# Patient Record
Sex: MALE | NOT HISPANIC OR LATINO | URBAN - METROPOLITAN AREA
[De-identification: names, ages, dates, MRNs, and addresses within clinical notes are randomized per-mention and may not be internally consistent; named-entity substitution may affect disease eponyms.]

---

## 2022-11-30 ENCOUNTER — LAB (OUTPATIENT)
Dept: LAB | Facility: CLINIC | Age: 37
End: 2022-11-30
Payer: COMMERCIAL

## 2022-11-30 ENCOUNTER — ANCILLARY PROCEDURE (OUTPATIENT)
Dept: GENERAL RADIOLOGY | Facility: CLINIC | Age: 37
End: 2022-11-30
Attending: NURSE PRACTITIONER
Payer: COMMERCIAL

## 2022-11-30 ENCOUNTER — OFFICE VISIT (OUTPATIENT)
Dept: FAMILY MEDICINE | Facility: CLINIC | Age: 37
End: 2022-11-30
Payer: COMMERCIAL

## 2022-11-30 VITALS
RESPIRATION RATE: 18 BRPM | HEART RATE: 53 BPM | TEMPERATURE: 97.9 F | DIASTOLIC BLOOD PRESSURE: 79 MMHG | OXYGEN SATURATION: 98 % | WEIGHT: 126 LBS | SYSTOLIC BLOOD PRESSURE: 124 MMHG

## 2022-11-30 DIAGNOSIS — R68.84 JAW PAIN: ICD-10-CM

## 2022-11-30 DIAGNOSIS — Z23 ENCOUNTER FOR IMMUNIZATION: ICD-10-CM

## 2022-11-30 DIAGNOSIS — F33.1 MODERATE EPISODE OF RECURRENT MAJOR DEPRESSIVE DISORDER (H): ICD-10-CM

## 2022-11-30 DIAGNOSIS — R63.4 UNINTENTIONAL WEIGHT LOSS: Primary | ICD-10-CM

## 2022-11-30 DIAGNOSIS — R63.4 UNINTENTIONAL WEIGHT LOSS: ICD-10-CM

## 2022-11-30 LAB
ALBUMIN SERPL BCG-MCNC: 4.7 G/DL (ref 3.5–5.2)
ALP SERPL-CCNC: 72 U/L (ref 40–129)
ALT SERPL W P-5'-P-CCNC: 11 U/L (ref 10–50)
ANION GAP SERPL CALCULATED.3IONS-SCNC: 12 MMOL/L (ref 7–15)
AST SERPL W P-5'-P-CCNC: 18 U/L (ref 10–50)
BASOPHILS # BLD AUTO: 0 10E3/UL (ref 0–0.2)
BASOPHILS NFR BLD AUTO: 1 %
BILIRUB SERPL-MCNC: 0.3 MG/DL
BUN SERPL-MCNC: 14.5 MG/DL (ref 6–20)
CALCIUM SERPL-MCNC: 9.7 MG/DL (ref 8.6–10)
CHLORIDE SERPL-SCNC: 102 MMOL/L (ref 98–107)
CREAT SERPL-MCNC: 0.76 MG/DL (ref 0.67–1.17)
DEPRECATED HCO3 PLAS-SCNC: 26 MMOL/L (ref 22–29)
EOSINOPHIL # BLD AUTO: 0.3 10E3/UL (ref 0–0.7)
EOSINOPHIL NFR BLD AUTO: 6 %
ERYTHROCYTE [DISTWIDTH] IN BLOOD BY AUTOMATED COUNT: 14.8 % (ref 10–15)
GFR SERPL CREATININE-BSD FRML MDRD: >90 ML/MIN/1.73M2
GLUCOSE SERPL-MCNC: 96 MG/DL (ref 70–99)
HCT VFR BLD AUTO: 38.5 % (ref 40–53)
HCV AB SERPL QL IA: NONREACTIVE
HGB BLD-MCNC: 12.8 G/DL (ref 13.3–17.7)
HIV 1+2 AB+HIV1 P24 AG SERPL QL IA: NONREACTIVE
IMM GRANULOCYTES # BLD: 0 10E3/UL
IMM GRANULOCYTES NFR BLD: 0 %
LYMPHOCYTES # BLD AUTO: 1.8 10E3/UL (ref 0.8–5.3)
LYMPHOCYTES NFR BLD AUTO: 31 %
MCH RBC QN AUTO: 31.3 PG (ref 26.5–33)
MCHC RBC AUTO-ENTMCNC: 33.2 G/DL (ref 31.5–36.5)
MCV RBC AUTO: 94 FL (ref 78–100)
MONOCYTES # BLD AUTO: 0.6 10E3/UL (ref 0–1.3)
MONOCYTES NFR BLD AUTO: 11 %
NEUTROPHILS # BLD AUTO: 2.9 10E3/UL (ref 1.6–8.3)
NEUTROPHILS NFR BLD AUTO: 51 %
NRBC # BLD AUTO: 0 10E3/UL
NRBC BLD AUTO-RTO: 0 /100
PLATELET # BLD AUTO: 198 10E3/UL (ref 150–450)
POTASSIUM SERPL-SCNC: 4.2 MMOL/L (ref 3.4–5.3)
PROT SERPL-MCNC: 7.7 G/DL (ref 6.4–8.3)
RBC # BLD AUTO: 4.09 10E6/UL (ref 4.4–5.9)
SODIUM SERPL-SCNC: 140 MMOL/L (ref 136–145)
TSH SERPL DL<=0.005 MIU/L-ACNC: 2.69 UIU/ML (ref 0.3–4.2)
WBC # BLD AUTO: 5.7 10E3/UL (ref 4–11)

## 2022-11-30 PROCEDURE — 71046 X-RAY EXAM CHEST 2 VIEWS: CPT | Performed by: RADIOLOGY

## 2022-11-30 PROCEDURE — 99204 OFFICE O/P NEW MOD 45 MIN: CPT | Mod: 25 | Performed by: NURSE PRACTITIONER

## 2022-11-30 PROCEDURE — 90471 IMMUNIZATION ADMIN: CPT | Performed by: NURSE PRACTITIONER

## 2022-11-30 PROCEDURE — 86481 TB AG RESPONSE T-CELL SUSP: CPT | Performed by: NURSE PRACTITIONER

## 2022-11-30 PROCEDURE — 0124A COVID-19 VACCINE BIVALENT BOOSTER 12+ (PFIZER): CPT | Performed by: NURSE PRACTITIONER

## 2022-11-30 PROCEDURE — 36415 COLL VENOUS BLD VENIPUNCTURE: CPT | Performed by: PATHOLOGY

## 2022-11-30 PROCEDURE — 80050 GENERAL HEALTH PANEL: CPT | Performed by: PATHOLOGY

## 2022-11-30 PROCEDURE — 90686 IIV4 VACC NO PRSV 0.5 ML IM: CPT | Performed by: NURSE PRACTITIONER

## 2022-11-30 PROCEDURE — 91312 COVID-19 VACCINE BIVALENT BOOSTER 12+ (PFIZER): CPT | Performed by: NURSE PRACTITIONER

## 2022-11-30 PROCEDURE — 86803 HEPATITIS C AB TEST: CPT | Performed by: NURSE PRACTITIONER

## 2022-11-30 PROCEDURE — 87389 HIV-1 AG W/HIV-1&-2 AB AG IA: CPT | Performed by: NURSE PRACTITIONER

## 2022-11-30 RX ORDER — METHADONE HYDROCHLORIDE 10 MG/5ML
130 SOLUTION ORAL DAILY
COMMUNITY

## 2022-11-30 ASSESSMENT — ANXIETY QUESTIONNAIRES
2. NOT BEING ABLE TO STOP OR CONTROL WORRYING: MORE THAN HALF THE DAYS
3. WORRYING TOO MUCH ABOUT DIFFERENT THINGS: SEVERAL DAYS
7. FEELING AFRAID AS IF SOMETHING AWFUL MIGHT HAPPEN: NOT AT ALL
6. BECOMING EASILY ANNOYED OR IRRITABLE: SEVERAL DAYS
1. FEELING NERVOUS, ANXIOUS, OR ON EDGE: MORE THAN HALF THE DAYS
GAD7 TOTAL SCORE: 8
GAD7 TOTAL SCORE: 8
5. BEING SO RESTLESS THAT IT IS HARD TO SIT STILL: SEVERAL DAYS

## 2022-11-30 ASSESSMENT — PATIENT HEALTH QUESTIONNAIRE - PHQ9
SUM OF ALL RESPONSES TO PHQ QUESTIONS 1-9: 17
5. POOR APPETITE OR OVEREATING: SEVERAL DAYS

## 2022-11-30 NOTE — NURSING NOTE
Chief Complaint   Patient presents with     Establish Care     Hasnt been to the doctor for a while      Physical     Has lost weight, about 30 lbs over roughly a year  Has not tried to loose weight   Is only eating about one meal a day      Blood pressure 124/79, pulse 53, temperature 97.9  F (36.6  C), temperature source Oral, resp. rate 18, weight 57.2 kg (126 lb), SpO2 98 %.    Joanne Carreon

## 2022-11-30 NOTE — PROGRESS NOTES
"Today's Date: Nov 30, 2022     Patient Melquiades Frye 1985 presents to the clinic today to address   Chief Complaint   Patient presents with     Establish Care     Hasnt been to the doctor for a while      Physical     Has lost weight, about 30 lbs over roughly a year  Has not tried to loose weight   Is only eating about one meal a day              SUBJECTIVE     History of Present Illness:    37-year-old male with PMH ADHD, Anxiety/MDD, Learning disability, substance use disorder (pt on methadone for past 5 years through [facility]) presents to establish care and discuss unintentional weight loss.    Unintentional weight loss- Patient reports losing 30lbs over the past 6-8 months. He endorses eating only 1 meal/day, which is normal for him. He uses marijuana daily at night to help him wind down, 0.5PPD tobacco, and scheduled methadone in the morning. He denies IVDU/amphethamine use (he was on Ritalin for ADHD while in grade school.) He endorses increased stress/depressive symptoms since his step-father was diagnosed with esophageal cancer (his family lives in [facility].) He was previously on Wellbutrin for depression but stopped it after 1 month since he \"didn't know if it was working\" and due to concerns of being on a medication daily. He denies SI/HI today. He denies CP, SOB, blood in stool, difficulty swallowing. He endorses chronic stomach cramping (reports being diagnosed with IBS in the past.) Endorses feeling tired but feels that is due to not staying asleep at night. 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 He has been homeless in the past though he denies coughing up blood.       R Jaw/Mouth pain- Patient noticed right Jaw/mouth pain that started last night without fevers, swelling, redness, difficulty swallowing. He reports seeing a dentist within the last year. No other acute concerns/symptoms at time of exam.    Review of Systems   Constitutional, HEENT, cardiovascular, pulmonary, gi and gu systems " are negative, except as otherwise noted.      Allergies   Allergen Reactions     Seasonal Allergies         No current outpatient medications    Past Medical History:   Diagnosis Date     ADD (attention deficit disorder)      ADHD (attention deficit hyperactivity disorder)      Anxiety      Depressive disorder      Learning disability         Family History   Problem Relation Age of Onset     Esophageal Cancer Father         Social History     Tobacco Use     Smoking status: Every Day     Packs/day: 0.50     Types: Cigarettes     Smokeless tobacco: Never   Vaping Use     Vaping Use: Never used   Substance Use Topics     Alcohol use: Not Currently     Comment: sober since september 4 2010     Drug use: Yes     Types: Marijuana        History   Sexual Activity     Sexual activity: Not on file        PHQ 11/30/2022   PHQ-9 Total Score 17   Q9: Thoughts of better off dead/self-harm past 2 weeks Not at all          There is no immunization history on file for this patient.     Health Maintenance components reviewed - Seasonal Influenza vaccine status is due & Covid-19 vaccine status is due.  Denies FHX colon/prostate cancer.           OBJECTIVE     /79 (BP Location: Right arm, Patient Position: Sitting, Cuff Size: Adult Regular)   Pulse 53   Temp 97.9  F (36.6  C) (Oral)   Resp 18   Wt 57.2 kg (126 lb)   SpO2 98%      Labs:  No results found for: WBC, HGB, HCT, PLT, CHOL, TRIG, HDL, LDLDIRECT, ALT, AST, NA, CREATININE, BUN, CO2, TSH, PSA, INR, GLUF, HGBA1C, MICROALBUR     Physical Exam  Constitutional:       General: He is not in acute distress.     Appearance: He is not ill-appearing.   HENT:      Head: Normocephalic.      Jaw: There is normal jaw occlusion. Tenderness present. No swelling or pain on movement.        Comments: Tenderness as noted. No TMJ tenderness noted.     Right Ear: Tympanic membrane normal.      Left Ear: Tympanic membrane normal.      Nose: Nose normal.      Mouth/Throat:      Mouth:  Mucous membranes are moist.      Tongue: No lesions.      Pharynx: Oropharynx is clear.   Eyes:      Extraocular Movements: Extraocular movements intact.      Pupils: Pupils are equal, round, and reactive to light.   Cardiovascular:      Rate and Rhythm: Normal rate.      Heart sounds: Normal heart sounds. No murmur heard.    No friction rub. No gallop.   Pulmonary:      Effort: Pulmonary effort is normal. No respiratory distress.      Breath sounds: Normal breath sounds. No wheezing, rhonchi or rales.   Abdominal:      General: Abdomen is flat. Bowel sounds are normal.      Palpations: Abdomen is soft. There is no hepatomegaly or mass.      Tenderness: There is no abdominal tenderness.   Genitourinary:     Comments: Declined by pt.  Musculoskeletal:         General: Normal range of motion.      Cervical back: Neck supple.      Right lower leg: No edema.      Left lower leg: No edema.   Lymphadenopathy:      Cervical: No cervical adenopathy.   Skin:     General: Skin is warm.   Neurological:      General: No focal deficit present.      Mental Status: He is alert.   Psychiatric:         Mood and Affect: Mood is anxious.         Speech: Speech is rapid and pressured.         Behavior: Behavior normal. Behavior is cooperative.         Thought Content: Thought content normal.               ASSESSMENT/PLAN     1. Unintentional weight loss  Pt endorses 30lb weight loss over 6-8 months. Unclear etiology at this time. Physical exam unremarkable today. Will check labs/imaging as noted below. If diagnostics are unremarkable, consider tobacco/marijuana/MDD as likely causes.  - CBC with platelets differential; Future  - Comprehensive metabolic panel; Future  - TSH with free T4 reflex; Future  - HCV Screen with Reflex; Future  - HIV Antigen Antibody Combo; Future  - XR Chest 2 Views; Future  - Quantiferon TB Gold Plus; Future      2. Encounter for immunization  Advised patient to reach out to insurance to discuss TDAP vaccine  coverage.    - COVID-19 VACCINE BIVALENT BOOSTER 12+ (PFIZER)  - TDAP VACCINE (Adacel, Boostrix)  [[id number]]; Future  - FLU VAC PRESRV FREE QUAD SPLIT VIR 3+YRS IM    3. Moderate episode of recurrent major depressive disorder (H)  Pt denies SI/HI. Discussed restarting Wellbutrin today considering his concurrent ADHD- pt wanted to think about it, we can discuss at follow-up. Referral placed for counseling.  - Adult Mental Health  Referral; Future    4. Jaw pain  Unclear etiology, advised dental exam if pain persists.    Follow-Up:  - Follow up in 2 week(s), or if symptoms worsen or fail to improve.     Options for treatment and follow-up care were reviewed with the patient. Patient engaged in the decision making process and verbalized understanding of the options discussed and agreed with the final plan.  AVS printed and given to patient.    AILYN Engel CNP    [facility]  [facility]  [address] [place] [id number]  [phone]

## 2022-12-01 LAB
QUANTIFERON MITOGEN: 10 IU/ML
QUANTIFERON NIL TUBE: 0.03 IU/ML
QUANTIFERON TB1 TUBE: 0.05 IU/ML
QUANTIFERON TB2 TUBE: 0.07

## 2022-12-02 LAB
GAMMA INTERFERON BACKGROUND BLD IA-ACNC: 0.03 IU/ML
M TB IFN-G BLD-IMP: NEGATIVE
M TB IFN-G CD4+ BCKGRND COR BLD-ACNC: 9.97 IU/ML
MITOGEN IGNF BCKGRD COR BLD-ACNC: 0.02 IU/ML
MITOGEN IGNF BCKGRD COR BLD-ACNC: 0.04 IU/ML

## 2022-12-14 ENCOUNTER — OFFICE VISIT (OUTPATIENT)
Dept: FAMILY MEDICINE | Facility: CLINIC | Age: 37
End: 2022-12-14
Payer: COMMERCIAL

## 2022-12-14 VITALS
OXYGEN SATURATION: 96 % | DIASTOLIC BLOOD PRESSURE: 76 MMHG | HEIGHT: 68 IN | WEIGHT: 123.6 LBS | BODY MASS INDEX: 18.73 KG/M2 | SYSTOLIC BLOOD PRESSURE: 117 MMHG | HEART RATE: 77 BPM

## 2022-12-14 DIAGNOSIS — R63.4 UNINTENTIONAL WEIGHT LOSS: Primary | ICD-10-CM

## 2022-12-14 DIAGNOSIS — F33.1 MODERATE EPISODE OF RECURRENT MAJOR DEPRESSIVE DISORDER (H): ICD-10-CM

## 2022-12-14 PROCEDURE — 99214 OFFICE O/P EST MOD 30 MIN: CPT | Performed by: NURSE PRACTITIONER

## 2022-12-14 ASSESSMENT — ANXIETY QUESTIONNAIRES
5. BEING SO RESTLESS THAT IT IS HARD TO SIT STILL: SEVERAL DAYS
7. FEELING AFRAID AS IF SOMETHING AWFUL MIGHT HAPPEN: SEVERAL DAYS
4. TROUBLE RELAXING: SEVERAL DAYS
3. WORRYING TOO MUCH ABOUT DIFFERENT THINGS: SEVERAL DAYS
2. NOT BEING ABLE TO STOP OR CONTROL WORRYING: SEVERAL DAYS
GAD7 TOTAL SCORE: 7
7. FEELING AFRAID AS IF SOMETHING AWFUL MIGHT HAPPEN: SEVERAL DAYS
1. FEELING NERVOUS, ANXIOUS, OR ON EDGE: SEVERAL DAYS
GAD7 TOTAL SCORE: 7
8. IF YOU CHECKED OFF ANY PROBLEMS, HOW DIFFICULT HAVE THESE MADE IT FOR YOU TO DO YOUR WORK, TAKE CARE OF THINGS AT HOME, OR GET ALONG WITH OTHER PEOPLE?: NOT DIFFICULT AT ALL
IF YOU CHECKED OFF ANY PROBLEMS ON THIS QUESTIONNAIRE, HOW DIFFICULT HAVE THESE PROBLEMS MADE IT FOR YOU TO DO YOUR WORK, TAKE CARE OF THINGS AT HOME, OR GET ALONG WITH OTHER PEOPLE: NOT DIFFICULT AT ALL
GAD7 TOTAL SCORE: 7
6. BECOMING EASILY ANNOYED OR IRRITABLE: SEVERAL DAYS

## 2022-12-14 NOTE — PROGRESS NOTES
Today's Date: Dec 14, 2022     Patient Melquiades Frye 1985 presents to the clinic today to address   Chief Complaint   Patient presents with     Follow Up     2 Weeks Follow-up Weight loss             SUBJECTIVE     History of Present Illness:    37-year-old male presents for 2 week follow-up on unintentional weight loss. (See note from 11/30/22 for full HPI.) He reports today that he is still only eating 1 meal per day. No change in bowel habits or foul smouling/floating BMs. He denies amphetamine use.      Hx MDD/ADHD- Has previously been on Wellbutrin, as well as other medications though he can't recall the names of the other previous medications today. He currently denies SI/HI. He has a therapist that he sees monthly. No other acute concerns/symptoms at time of exam.    Review of Systems   Constitutional, HEENT, cardiovascular, pulmonary, gi and gu systems are negative, except as otherwise noted.        Allergies   Allergen Reactions     Seasonal Allergies         Current Outpatient Medications   Medication Instructions     methadone HCl 130 mg, Oral, DAILY       Past Medical History:   Diagnosis Date     ADD (attention deficit disorder)      ADHD (attention deficit hyperactivity disorder)      Anxiety      Depressive disorder      Learning disability         Family History   Problem Relation Age of Onset     Esophageal Cancer Father         Social History     Tobacco Use     Smoking status: Every Day     Packs/day: 0.50     Types: Cigarettes     Smokeless tobacco: Never   Vaping Use     Vaping Use: Never used   Substance Use Topics     Alcohol use: Not Currently     Comment: sober since september 4 2010     Drug use: Yes     Types: Marijuana        History   Sexual Activity     Sexual activity: Not on file        PHQ 11/30/2022   PHQ-9 Total Score 17   Q9: Thoughts of better off dead/self-harm past 2 weeks Not at all        Immunization History   Administered Date(s) Administered     COVID-19  "Vaccine 18+ (Moderna) 06/10/2021, 07/08/2021     COVID-19 Vaccine Bivalent Booster 12+ (Pfizer) 11/30/2022     Influenza Vaccine >6 months (Alfuria,Fluzone) 11/30/2022        Health Maintenance components reviewed - Seasonal Influenza vaccine status is up to date & Covid-19 vaccine status is up to date.         OBJECTIVE     /76 (BP Location: Right arm, Patient Position: Chair, Cuff Size: Adult Regular)   Pulse 77   Ht 1.715 m (5' 7.5\")   Wt 56.1 kg (123 lb 9.6 oz)   SpO2 96%   BMI 19.07 kg/m       Labs:  Lab Results   Component Value Date    WBC 5.7 11/30/2022    HGB 12.8 (L) 11/30/2022    HCT 38.5 (L) 11/30/2022     11/30/2022    ALT 11 11/30/2022    AST 18 11/30/2022     11/30/2022    BUN 14.5 11/30/2022    CO2 26 11/30/2022    TSH 2.69 11/30/2022        Physical Exam  Constitutional:       General: He is not in acute distress.     Appearance: He is not ill-appearing.   Cardiovascular:      Rate and Rhythm: Normal rate.   Pulmonary:      Effort: Pulmonary effort is normal. No respiratory distress.   Musculoskeletal:      Cervical back: Neck supple.   Neurological:      Mental Status: He is alert.   Psychiatric:         Mood and Affect: Mood normal.         Behavior: Behavior is hyperactive.          Recent Results (from the past 504 hour(s))   Comprehensive metabolic panel    Collection Time: 11/30/22 12:13 PM   Result Value Ref Range    Sodium 140 136 - 145 mmol/L    Potassium 4.2 3.4 - 5.3 mmol/L    Chloride 102 98 - 107 mmol/L    Carbon Dioxide (CO2) 26 22 - 29 mmol/L    Anion Gap 12 7 - 15 mmol/L    Urea Nitrogen 14.5 6.0 - 20.0 mg/dL    Creatinine 0.76 0.67 - 1.17 mg/dL    Calcium 9.7 8.6 - 10.0 mg/dL    Glucose 96 70 - 99 mg/dL    Alkaline Phosphatase 72 40 - 129 U/L    AST 18 10 - 50 U/L    ALT 11 10 - 50 U/L    Protein Total 7.7 6.4 - 8.3 g/dL    Albumin 4.7 3.5 - 5.2 g/dL    Bilirubin Total 0.3 <=1.2 mg/dL    GFR Estimate >90 >60 mL/min/1.73m2   TSH with free T4 reflex    " Collection Time: 11/30/22 12:13 PM   Result Value Ref Range    TSH 2.69 0.30 - 4.20 uIU/mL   HCV Screen with Reflex    Collection Time: 11/30/22 12:13 PM   Result Value Ref Range    Hepatitis C Antibody Nonreactive Nonreactive   HIV Antigen Antibody Combo    Collection Time: 11/30/22 12:13 PM   Result Value Ref Range    HIV Antigen Antibody Combo Nonreactive Nonreactive   CBC with platelets and differential    Collection Time: 11/30/22 12:13 PM   Result Value Ref Range    WBC Count 5.7 4.0 - 11.0 10e3/uL    RBC Count 4.09 (L) 4.40 - 5.90 10e6/uL    Hemoglobin 12.8 (L) 13.3 - 17.7 g/dL    Hematocrit 38.5 (L) 40.0 - 53.0 %    MCV 94 78 - 100 fL    MCH 31.3 26.5 - 33.0 pg    MCHC 33.2 31.5 - 36.5 g/dL    RDW 14.8 10.0 - 15.0 %    Platelet Count 198 150 - 450 10e3/uL    % Neutrophils 51 %    % Lymphocytes 31 %    % Monocytes 11 %    % Eosinophils 6 %    % Basophils 1 %    % Immature Granulocytes 0 %    NRBCs per 100 WBC 0 <1 /100    Absolute Neutrophils 2.9 1.6 - 8.3 10e3/uL    Absolute Lymphocytes 1.8 0.8 - 5.3 10e3/uL    Absolute Monocytes 0.6 0.0 - 1.3 10e3/uL    Absolute Eosinophils 0.3 0.0 - 0.7 10e3/uL    Absolute Basophils 0.0 0.0 - 0.2 10e3/uL    Absolute Immature Granulocytes 0.0 <=0.4 10e3/uL    Absolute NRBCs 0.0 10e3/uL   Quantiferon TB Gold Plus Grey Tube    Collection Time: 11/30/22 12:13 PM    Specimen: Arm, Left; Blood   Result Value Ref Range    Quantiferon Nil Tube 0.03 IU/mL   Quantiferon TB Gold Plus Green Tube    Collection Time: 11/30/22 12:13 PM    Specimen: Arm, Left; Blood   Result Value Ref Range    Quantiferon TB1 Tube 0.05 IU/mL   Quantiferon TB Gold Plus Yellow Tube    Collection Time: 11/30/22 12:13 PM    Specimen: Arm, Left; Blood   Result Value Ref Range    Quantiferon TB2 Tube 0.07    Quantiferon TB Gold Plus Purple Tube    Collection Time: 11/30/22 12:13 PM    Specimen: Arm, Left; Blood   Result Value Ref Range    Quantiferon Mitogen 10.00 IU/mL   Quantiferon TB Gold Plus    Collection  Time: 11/30/22 12:13 PM    Specimen: Arm, Left; Blood   Result Value Ref Range    Quantiferon-TB Gold Plus Negative Negative    TB1 Ag minus Nil Value 0.02 IU/mL    TB2 Ag minus Nil Value 0.04 IU/mL    Mitogen minus Nil Result 9.97 IU/mL    Nil Result 0.03 IU/mL     Chest 2 views     INDICATION: Unintentional weight loss.     COMPARISON: None     FINDINGS: Heart size and shape appear normal. Lungs and pulmonary  vascularity appear normal. Bony structures appear grossly intact.                                                                      IMPRESSION: Negative           ASSESSMENT/PLAN     1. Unintentional weight loss  Initial workup unremarkable. He is down 3 lbs today compared to 2 weeks ago. He does not have a scale at home. Discussed further imaging vs lifestyle modification and rechecking weight in 1 month, patient opted for the latter. Depression and Methadone use could be contributory as well as tobacco/marijuana use.  Advised smoking/marijuana cessation and to increase caloric intake/eat at least 3 meals per day.    2. Moderate episode of recurrent major depressive disorder (H)  Denies SI/HI today. He appeared quite hyperactive though he denies amphetamine use. He has an extensive MH hx including MDD, ADHD, Substance use disorder- will place consult for psych medication stabilization.  - Adult Mental Health  Referral; Future    .    Follow-Up:  - Follow up in 4 week(s), or if symptoms worsen or fail to improve.     Options for treatment and follow-up care were reviewed with the patient. Patient engaged in the decision making process and verbalized understanding of the options discussed and agreed with the final plan.  AVS printed and given to patient.    AILYN Engel CNP    [facility]  [facility] [address] [address]  [phone]

## 2022-12-14 NOTE — NURSING NOTE
Patient presents with:  Follow Up: 2 Weeks Follow-up Weight loss      Data Unavailable     Pain Medications     Opioid Agonists Refills Start End     methadone HCl 10 MG/5ML SOLN          Sig - Route: Take 130 mg by mouth daily - Oral    Class: Historical          What medications are you using for pain? Methadone    (New patients only) Have you been seen by another pain clinic/ provider? no    (Return Patients only) What refills are you needing today? no